# Patient Record
Sex: FEMALE | Race: BLACK OR AFRICAN AMERICAN | NOT HISPANIC OR LATINO | Employment: UNEMPLOYED
[De-identification: names, ages, dates, MRNs, and addresses within clinical notes are randomized per-mention and may not be internally consistent; named-entity substitution may affect disease eponyms.]

---

## 2017-12-24 ENCOUNTER — HEALTH MAINTENANCE LETTER (OUTPATIENT)
Age: 22
End: 2017-12-24

## 2021-08-17 ENCOUNTER — HOSPITAL ENCOUNTER (EMERGENCY)
Facility: CLINIC | Age: 26
Discharge: HOME | End: 2021-08-17
Attending: EMERGENCY MEDICINE | Admitting: EMERGENCY MEDICINE
Payer: COMMERCIAL

## 2021-08-17 VITALS
WEIGHT: 160 LBS | SYSTOLIC BLOOD PRESSURE: 98 MMHG | DIASTOLIC BLOOD PRESSURE: 54 MMHG | TEMPERATURE: 97.1 F | HEIGHT: 67 IN | HEART RATE: 68 BPM | OXYGEN SATURATION: 99 % | RESPIRATION RATE: 16 BRPM | BODY MASS INDEX: 25.11 KG/M2

## 2021-08-17 DIAGNOSIS — T78.40XA ALLERGIC REACTION, INITIAL ENCOUNTER: ICD-10-CM

## 2021-08-17 PROCEDURE — 250N000013 HC RX MED GY IP 250 OP 250 PS 637: Performed by: EMERGENCY MEDICINE

## 2021-08-17 PROCEDURE — 99283 EMERGENCY DEPT VISIT LOW MDM: CPT | Performed by: EMERGENCY MEDICINE

## 2021-08-17 PROCEDURE — 99284 EMERGENCY DEPT VISIT MOD MDM: CPT | Mod: GC | Performed by: EMERGENCY MEDICINE

## 2021-08-17 RX ORDER — DIPHENHYDRAMINE HCL 50 MG
50 CAPSULE ORAL ONCE
Status: COMPLETED | OUTPATIENT
Start: 2021-08-17 | End: 2021-08-17

## 2021-08-17 RX ORDER — DIPHENHYDRAMINE HCL 25 MG
25 TABLET ORAL EVERY 6 HOURS PRN
Qty: 28 TABLET | Refills: 0 | Status: SHIPPED | OUTPATIENT
Start: 2021-08-17 | End: 2021-08-24

## 2021-08-17 RX ORDER — METHYLPREDNISOLONE 4 MG
TABLET, DOSE PACK ORAL
Qty: 21 TABLET | Refills: 0 | Status: SHIPPED | OUTPATIENT
Start: 2021-08-17 | End: 2022-07-09

## 2021-08-17 RX ADMIN — DIPHENHYDRAMINE HYDROCHLORIDE 50 MG: 50 CAPSULE ORAL at 07:28

## 2021-08-17 ASSESSMENT — ENCOUNTER SYMPTOMS
DIARRHEA: 0
ACTIVITY CHANGE: 0
LIGHT-HEADEDNESS: 0
WOUND: 0
CHOKING: 0
PHOTOPHOBIA: 0
DIZZINESS: 0
COUGH: 0
WEAKNESS: 0
FACIAL SWELLING: 0
DIAPHORESIS: 0
APNEA: 0
PALPITATIONS: 0
VOMITING: 0
WHEEZING: 0
CHILLS: 0
RHINORRHEA: 0
ABDOMINAL PAIN: 0
SORE THROAT: 0
ARTHRALGIAS: 0
CONSTIPATION: 0
TROUBLE SWALLOWING: 0
NUMBNESS: 0
FEVER: 0
NAUSEA: 0
CONFUSION: 0
SHORTNESS OF BREATH: 0

## 2021-08-17 ASSESSMENT — MIFFLIN-ST. JEOR: SCORE: 1503.39

## 2021-08-17 NOTE — DISCHARGE INSTRUCTIONS
Please make an appointment to follow up with your Primary Care Provider 5-7 days and Allergy Clinic ([phone]). You should get allergy testing to determine potential allergens.      You were given a referral to Allergy clinic. Someone should call you to set up this appointment, but please call the number listed above, if you do not hear from someone within 1-2 business days.

## 2021-08-17 NOTE — ED PROVIDER NOTES
ED Provider Note  [facility]      History     Chief Complaint   Patient presents with     Hives     HPI  Nik Ross is a 25 year old previously healthy female who presents with hives on her face and arms overnight. Nik notes that she had shrimp scampi at 7PM at a new restaurant and did not notice any symptoms immediately after. At around 10PM she noticed small area of hives on each cheek, which she iced for a bit and then went to bed. At 1AM she noticed a worsening of the hives mostly on the left side of her face. At around 5AM Nik noticed hives extending down her bilateral arms. Since then she states that the hives have improved and no longer are present on her face. She has not noticed hives on any other part of her body. Nik states that she has felt warm and itchy. She has not taken any medication for the hives. Nik reports that she has no known allergies and has never had a similar rash in the past. She reports no associated pain, sensation change, difficulty breathing, nausea/vomiting, increased diarrhea/constipation, abdominal pain, fevers/chills, sore throat, or chest pain.    Past Medical History  History reviewed. No pertinent past medical history.  History reviewed. No pertinent surgical history.  diphenhydrAMINE (BENADRYL) 25 MG tablet  methylPREDNISolone (MEDROL DOSEPAK) 4 MG tablet therapy pack      No Known Allergies  Family History  History reviewed. No pertinent family history.  Social History   Social History     Tobacco Use     Smoking status: Never Smoker     Smokeless tobacco: Never Used   Substance Use Topics     Alcohol use: Not Currently     Drug use: Never      Past medical history, past surgical history, medications, allergies, family history, and social history were reviewed with the patient. No additional pertinent items.       Review of Systems   Constitutional: Negative for activity change, chills, diaphoresis and fever.   HENT: Negative for  "congestion, facial swelling, postnasal drip, rhinorrhea, sore throat and trouble swallowing.    Eyes: Negative for photophobia and visual disturbance.   Respiratory: Negative for apnea, cough, choking, shortness of breath and wheezing.    Cardiovascular: Negative for chest pain and palpitations.   Gastrointestinal: Negative for abdominal pain, constipation, diarrhea, nausea and vomiting.   Musculoskeletal: Negative for arthralgias.   Skin: Positive for rash (Hives). Negative for wound.   Neurological: Negative for dizziness, syncope, weakness, light-headedness and numbness.   Psychiatric/Behavioral: Negative for behavioral problems and confusion.     A complete review of systems was performed with pertinent positives and negatives noted in the HPI, and all other systems negative.    Physical Exam   BP: 113/72  Pulse: 73  Temp: 98.7  F (37.1  C)  Resp: 16  Height: 170.2 cm (5' 7\")  Weight: 72.6 kg (160 lb)  SpO2: 100 %  Physical Exam  Vitals and nursing note reviewed.   Constitutional:       General: She is not in acute distress.     Appearance: She is normal weight. She is not ill-appearing.   HENT:      Head: Normocephalic and atraumatic.      Right Ear: External ear normal.      Left Ear: External ear normal.      Nose: Nose normal. No congestion or rhinorrhea.      Mouth/Throat:      Mouth: Mucous membranes are moist.      Pharynx: Oropharynx is clear. No oropharyngeal exudate or posterior oropharyngeal erythema.   Eyes:      Extraocular Movements: Extraocular movements intact.      Conjunctiva/sclera: Conjunctivae normal.   Cardiovascular:      Rate and Rhythm: Normal rate and regular rhythm.      Heart sounds: Normal heart sounds. No murmur heard.   No friction rub. No gallop.    Pulmonary:      Effort: Pulmonary effort is normal. No respiratory distress.      Breath sounds: Normal breath sounds. No wheezing, rhonchi or rales.   Abdominal:      General: Abdomen is flat.      Palpations: Abdomen is soft.      " Tenderness: There is no abdominal tenderness. There is no guarding or rebound.   Musculoskeletal:         General: No swelling or tenderness.   Skin:     General: Skin is warm.      Findings: Rash (Mild urticaria on bilateral arms. Not visualized on face) present. No erythema or lesion.   Neurological:      General: No focal deficit present.      Mental Status: She is alert and oriented to person, place, and time.      Sensory: No sensory deficit.   Psychiatric:         Mood and Affect: Mood normal.         Behavior: Behavior normal.       ED Course      Procedures       Patient received 50mg benadryl in the ED.     No results found for any visits on 08/17/21.  Medications   diphenhydrAMINE (BENADRYL) capsule 50 mg (50 mg Oral Given 8/17/21 0728)        Assessments & Plan (with Medical Decision Making)   Nik Ross is a 25 year old previously healthy female who presents with hives on her face and arms overnight. This is most likely a mild allergic reaction to an unknown allergen given patients urticaria on face and arms that started after eating at a new restaurant. Nik only has skin involvement at this point with no signs of angioedema, difficulty breathing, or lightheadedness making anaphylaxis unlikely. Given minor symptoms patient did not require dose of corticosteroids in ED or epinephrine. Patient was administered 50mg Benadryl in the ED. Patient was discharged home with additional Benedryl and a Medrol Dosepak. Return precautions include difficulty breathing or signs of syncope or presyncope such as lightheadedness or dizziness. We recommended Nik follow-up with an allergy specialist to be tested for potential allergens in the near future.    I have reviewed the nursing notes. I have reviewed the findings, diagnosis, plan and need for follow up with the patient.    Discharge Medication List as of 8/17/2021  8:49 AM      START taking these medications    Details   diphenhydrAMINE (BENADRYL) 25 MG  tablet Take 1 tablet (25 mg) by mouth every 6 hours as needed for itching or allergies, Disp-28 tablet, R-0, E-Prescribe      methylPREDNISolone (MEDROL DOSEPAK) 4 MG tablet therapy pack Follow Package Directions, Disp-21 tablet, R-0, E-Prescribe             Final diagnoses:   Allergic reaction, initial encounter     Fili Bernstein, MS4  [employer]      --  Ida Perkins  [facility] EMERGENCY DEPARTMENT  8/17/2021    --    ED Attending Physician Attestation    I Ida Perkins MD, cared for this patient with the Medical Student. I performed, or re-performed, the physical exam and medical decision-making. I have verified the accuracy of all the medical student findings and documentation above, and have edited as necessary.    Summary of HPI, PE, ED Course   Patient is a 25 year old female evaluated in the emergency department for hives. Exam notable for mild urticarial rash. No angioedema or resp distress. ED course notable for improvement with benadryl. After the completion of care in the emergency department, the patient was discharged. Given rx medrol dosepak and benadryl. Referred to allergy clinic. Return precautions provided.       Critical Care & Procedures  Not applicable.    Medical Decision Making  The medical record was reviewed and interpreted.  Managed outpatient prescription medications.      Ida Perkins MD  Emergency Medicine          Ida Perkins MD  08/17/21 2014

## 2021-08-17 NOTE — ED TRIAGE NOTES
Pt reports development of worsening whole body hives since 2200 yesterday. States symptoms began shortly after finishing a meal at a new restaurant. Pt denies known allergies but notes that meal contained shrimp. Denies SOB/Difficulty swallowing. Breathing unlabored, no signs of acute distress at this time.   Patient with one or more new problems requiring additional work-up/treatment.

## 2021-08-18 ENCOUNTER — TELEPHONE (OUTPATIENT)
Dept: ALLERGY | Facility: CLINIC | Age: 26
End: 2021-08-18
Payer: COMMERCIAL

## 2021-08-18 NOTE — TELEPHONE ENCOUNTER
[facility]    Phone Message    May a detailed message be left on voicemail: yes     Reason for Call: Appointment Intake    Referring Provider Name: Dr Ida Perkins  Diagnosis and/or Symptoms: Hives due to food allergy, see ED note. New referral need review, thanks!     Action Taken: Message routed to:  [facility]): Allergy    Travel Screening: Not Applicable

## 2021-10-12 NOTE — TELEPHONE ENCOUNTER
FUTURE VISIT INFORMATION      FUTURE VISIT INFORMATION:    Date: 11.16.21    Time: 12:15 PM    Location:  Allergy  REFERRAL INFORMATION:    Referring provider:  Gregorio    Referring providers clinic:  [facility]    Reason for visit/diagnosis  Allergic reaction    RECORDS REQUESTED FROM:       Clinic name Comments Records Status Imaging Status   [facility] 8.17.21 Gregorio CORDOBA

## 2021-11-16 ENCOUNTER — OFFICE VISIT (OUTPATIENT)
Dept: ALLERGY | Facility: CLINIC | Age: 26
End: 2021-11-16
Attending: EMERGENCY MEDICINE
Payer: COMMERCIAL

## 2021-11-16 ENCOUNTER — PRE VISIT (OUTPATIENT)
Dept: ALLERGY | Facility: CLINIC | Age: 26
End: 2021-11-16

## 2021-11-16 DIAGNOSIS — J31.0 CHRONIC RHINITIS: ICD-10-CM

## 2021-11-16 DIAGNOSIS — L50.8 ACUTE URTICARIA: Primary | ICD-10-CM

## 2021-11-16 DIAGNOSIS — Z91.018 FOOD ALLERGY: ICD-10-CM

## 2021-11-16 DIAGNOSIS — T78.40XA ALLERGIC REACTION, INITIAL ENCOUNTER: ICD-10-CM

## 2021-11-16 PROCEDURE — 99203 OFFICE O/P NEW LOW 30 MIN: CPT | Mod: 25 | Performed by: DERMATOLOGY

## 2021-11-16 PROCEDURE — 95004 PERQ TESTS W/ALRGNC XTRCS: CPT | Performed by: DERMATOLOGY

## 2021-11-16 RX ORDER — PREDNISONE 50 MG/1
TABLET ORAL
Qty: 2 TABLET | Refills: 1 | Status: SHIPPED | OUTPATIENT
Start: 2021-11-16 | End: 2022-07-09

## 2021-11-16 RX ORDER — CETIRIZINE HYDROCHLORIDE 10 MG/1
TABLET ORAL
Qty: 10 TABLET | Refills: 1 | Status: SHIPPED | OUTPATIENT
Start: 2021-11-16 | End: 2022-07-09

## 2021-11-16 ASSESSMENT — PAIN SCALES - GENERAL: PAINLEVEL: NO PAIN (0)

## 2021-11-16 NOTE — LETTER
11/16/2021         RE: Nik Ross  [address]  [facility] [address]        Dear Colleague,    Thank you for referring your patient, Nik Ross, to the [facility] [place]. Please see a copy of my visit note below.    [facility] Dermato-allergology Note  Office visit  Encounter Date: Nov 16, 2021  ____________________________________________    CC: No chief complaint on file.      HPI:  (11/15/21)  Ms. Nik Ross is a(n) 26 year old female who presents today as a new patient for allergy consultation  - August 17th during the day nothing ordinary and went to a restaurant around 7pm. Ate shrimp scampi (noodles with shrimp), drinking some weird house drink (club soda in matcha and honey, lemon). No problems later, but around 11pm, when falling asleep after shower developed first in face and then arms and ears, redness, heat, swelling and hot uncomfortable feeling. Went to emergency around 5pm and got there a Benadryl and Prednisone. Diffuse swelling of arms and face. No signs for Angioedema.  - throughout the week on and off flare ups, but within 1 week resolved  - felt totally OK, no fever, no Tylenol (not that day)  - no exercise, no medications  - not sure if eaten shrimps afterwards  - Never before or after allergies    Physical exam:  General: In no acute distress, well-developed, well-nourished  Eyes: no conjunctivitis  ENT: no signs of rhinitis   Pulmonary: no wheezing or coughing  Skin: Focused examination of the skin on test sites was performed = see test results below    Past Medical History:   There is no problem list on file for this patient.    No past medical history on file.    Allergies:  No Known Allergies    Medications:  Current Outpatient Medications   Medication     methylPREDNISolone (MEDROL DOSEPAK) 4 MG tablet therapy pack     No current facility-administered medications for this visit.       Social History:  The  patient in the moment unemployed (in August no work) --> Bachelor in global studies. Patient has the following hobbies or non-occupational exposure: none    Family History:  No family history on file.    Previous Labs, Allergy Tests, Dermatopathology, Imaging:  none    Referred By: Ida Perkins MD  [address] [place] [address]     Allergy Tests:    Past Allergy Test    Order for Future Allergy Testing:    [] Outpatient  [] Inpatient: [facility]..../ Bed ....       Skin Atopy (atopic dermatitis) [] Yes   [] No .........  Contact allergies:   [] Yes   [] No ..........  Hand eczema:   [] Yes   [] No           Leading hand:   [] R   [] L       [] Ambidextrous         Drug allergies:        [] Yes   [] No  which?......    Urticaria/Angioedema  [] Yes   [] No .........  Food Allergy:  [] Yes   [] No  which?......  Pets :  [] Yes   [] No  which?......         [x]  Rhinitis   [] Conjunctivitis   [] Sinusitis   [] Polyposis   [] Otitis   [] Pharyngitis         []  none  Operations:   [] Tonsils   [] Septum   [] Sinus   [] Polyposis        [] Asthma bronchiale   [] Coughing      [x]  none  Symptoms (mostly Rhinoconjunctivitis and Asthma) aggravated by:  Season   [] I   [] II   [] III   [] IV   []V   []VI   []VII   []VIII   []IX   []X   []XI   []XI     [x] perennial since childhood  Day time      [] morning   [] noon      [] evening        [] night    [x] whole day........  []  none  Location/changes    [x] inside        [] outside   [] mountains    [] sea     [] others.............   []  none  Triggers, specific     [] animals     [] plants     [] dust              [] others ...........................    []  none  Triggers, others       [] work          [] psyche    [] sport            [] others .............................  []  none  Irritant                [] phys efforts [] smoke    [] heat/cold     [] odors  []others............... []  none      Order for PRICK TESTS    Reason for tests (suspected allergy):  acute Urticaria (food?) and perennial rhinitis  Known previous allergies: none    Standardized prick panels  [x] Atopic panel (20 tests)  [] Pediatric Panel (12 tests)  [] Milk, Meat, Eggs, Grains (20 tests)   [] Dust, Epithelia, Feathers (10 tests)  [x] Fish, Seafood, Shellfish (17 tests)  [] Nuts, Beans (14 tests)  [] Spice, Vegetable, Fruit (17 tests)  [] Pollen Panel = Tree, Grass, Weed (24 tests)  [] Others: ...      [x] Patient's own products: maybe in future prick/prick with food and tea    DO NOT test if chemical or biological identity is unknown!     always ask from patient the product information and safety sheets (MSDS)     Standardized intradermal tests  [] Penicillium notatum [] Aspergillus fumigatus [] House dust mites D.far & D. pteron  [] Cat    [] dog  [] Others: ...  [] Bee venom   [] Wasp venom  !!Specific protocol with dilutions!!       Order for Drug allergy tests (prick & Intradermal & patch tests)    [] Penicillin G  [] Ampicillin [] Cefazolin   [] Ceftriaxone   [] Ceftazidime  [] Bactrim    [] Others: ...  Order for ... as test date      Atopy Screen (Placed 11/16/21)    No Substance Readings (15 min) Evaluation   POS Histamine 1mg/ml -    NEG NaCl 0.9% -      No Substance Readings (15 min) Evaluation   1 Alternaria alternata (tenuis)  -    2 Cladosporium herbarum -    3 Aspergillus fumigatus -    4 Penicillium notatum -    5 Dermatophagoides pteronyssinus -    6 Dermatophagoides farinae -    7 Dog epithelium (canis spp) -    8 Cat hair (shelly catus) -    9 Cockroach   (Blatella americana & germanica) -    10 Grass mix midwest   (Marlene, Orchard, Redtop, David) -    11 Richi grass (sorghum halepense) -    12 Weed mix   (common Cocklebur, Lamb s quarters, rough redroot Pigweed, Dock/Sorrel) -    13 Mug wort (artemisia vulgare) -    14 Ragweed giant/short (ambrosia spp) -    15 White birch (Betula papyrifera) -    16 Tree mix 1 (Pecan, Maple BHR, Oak RVW, american [place], black [place]) -     17 Red cedar (juniperus virginia) -    18 Tree mix 2   (white Jayden, river/red Birch, black [place], common Navarro, american Elm) -    19 Box elder/Maple mix (acer spp) -    20 [place] shagbark (carya ovata) -           Conclusion    Fish and Seafood (Placed 11/16/21 )    11 Crab (callinectes spp) -    12 Lobster (homarus americanus) -    13 Shrimp white/brown/pink (farfantepenaeus&litopenaeus) -    14 Kingcrab (paralithodes camtschatica) -    15 Scallop (placopecten magellanicus) -    16 Clam (mercenaria mercenaria) -    17 Oyster (cstrea/crassostrea) -      Conclusion:  ________________________________    Assessment & Plan:    ==> Final Diagnosis:     # perennial Rhinitis (during the day)    = no signs for sensitization to pollens or other environmental allergens  * chronic illness with exacerbation, progression, side effects from treatment    # acute flare up of Urticaria after eating shrimp noodles and matcha/soda drink   = no signs for allergy to shellfish    * acute illness with systemic symptoms      These conclusions are made at the best of one's knowledge and belief based on the provided evidence such as patient's history and allergy test results and they can change over time or can be incomplete because of missing information's.    ==> Treatment Plan:  >> Emergency set and 12h retrospective diary if taken  >> if recurrences, then come with the food that was involved and the 12h retrospective diary      Procedures Performed: Allergy tests, including prick tests    Staff:  Provider    Follow-up in Derm-Allergy clinic if necessary    I spent a total of 30 minutes with Nik Ross. This time was spent counseling the patient and/or coordinating care, explaining the allergy tests , performing allergy tests and assessing the clinical relevance.        Again, thank you for allowing me to participate in the care of your patient.        Sincerely,        Yifan Delgado MD

## 2021-11-16 NOTE — NURSING NOTE
Chief Complaint   Patient presents with     Allergy Consult     Nik is here today off of an episode in the Summer where she had an allergic reaction. She has no idea what the trigger was to cause the symptoms.      Fili Stoddard, Paramedic

## 2021-11-16 NOTE — PROGRESS NOTES
[facility] Dermato-allergology Note  Office visit  Encounter Date: Nov 16, 2021  ____________________________________________    CC: No chief complaint on file.      HPI:  (11/15/21)  Ms. Nik Ross is a(n) 26 year old female who presents today as a new patient for allergy consultation  - August 17th during the day nothing ordinary and went to a restaurant around 7pm. Ate shrimp scampi (noodles with shrimp), drinking some weird house drink (club soda in matcha and honey, lemon). No problems later, but around 11pm, when falling asleep after shower developed first in face and then arms and ears, redness, heat, swelling and hot uncomfortable feeling. Went to emergency around 5pm and got there a Benadryl and Prednisone. Diffuse swelling of arms and face. No signs for Angioedema.  - throughout the week on and off flare ups, but within 1 week resolved  - felt totally OK, no fever, no Tylenol (not that day)  - no exercise, no medications  - not sure if eaten shrimps afterwards  - Never before or after allergies    Physical exam:  General: In no acute distress, well-developed, well-nourished  Eyes: no conjunctivitis  ENT: no signs of rhinitis   Pulmonary: no wheezing or coughing  Skin: Focused examination of the skin on test sites was performed = see test results below    Past Medical History:   There is no problem list on file for this patient.    No past medical history on file.    Allergies:  No Known Allergies    Medications:  Current Outpatient Medications   Medication     methylPREDNISolone (MEDROL DOSEPAK) 4 MG tablet therapy pack     No current facility-administered medications for this visit.       Social History:  The patient in the moment unemployed (in August no work) --> Bachelor in global studies. Patient has the following hobbies or non-occupational exposure: none    Family History:  No family history on file.    Previous Labs, Allergy Tests, Dermatopathology,  Imaging:  none    Referred By: Ida Perkins MD  [address] [place] [address]     Allergy Tests:    Past Allergy Test    Order for Future Allergy Testing:    [] Outpatient  [] Inpatient: [facility]..../ Bed ....       Skin Atopy (atopic dermatitis) [] Yes   [] No .........  Contact allergies:   [] Yes   [] No ..........  Hand eczema:   [] Yes   [] No           Leading hand:   [] R   [] L       [] Ambidextrous         Drug allergies:        [] Yes   [] No  which?......    Urticaria/Angioedema  [] Yes   [] No .........  Food Allergy:  [] Yes   [] No  which?......  Pets :  [] Yes   [] No  which?......         [x]  Rhinitis   [] Conjunctivitis   [] Sinusitis   [] Polyposis   [] Otitis   [] Pharyngitis         []  none  Operations:   [] Tonsils   [] Septum   [] Sinus   [] Polyposis        [] Asthma bronchiale   [] Coughing      [x]  none  Symptoms (mostly Rhinoconjunctivitis and Asthma) aggravated by:  Season   [] I   [] II   [] III   [] IV   []V   []VI   []VII   []VIII   []IX   []X   []XI   []XI     [x] perennial since childhood  Day time      [] morning   [] noon      [] evening        [] night    [x] whole day........  []  none  Location/changes    [x] inside        [] outside   [] mountains    [] sea     [] others.............   []  none  Triggers, specific     [] animals     [] plants     [] dust              [] others ...........................    []  none  Triggers, others       [] work          [] psyche    [] sport            [] others .............................  []  none  Irritant                [] phys efforts [] smoke    [] heat/cold     [] odors  []others............... []  none      Order for PRICK TESTS    Reason for tests (suspected allergy): acute Urticaria (food?) and perennial rhinitis  Known previous allergies: none    Standardized prick panels  [x] Atopic panel (20 tests)  [] Pediatric Panel (12 tests)  [] Milk, Meat, Eggs, Grains (20 tests)   [] Dust, Epithelia, Feathers (10 tests)  [x]  Fish, Seafood, Shellfish (17 tests)  [] Nuts, Beans (14 tests)  [] Spice, Vegetable, Fruit (17 tests)  [] Pollen Panel = Tree, Grass, Weed (24 tests)  [] Others: ...      [x] Patient's own products: maybe in future prick/prick with food and tea    DO NOT test if chemical or biological identity is unknown!     always ask from patient the product information and safety sheets (MSDS)     Standardized intradermal tests  [] Penicillium notatum [] Aspergillus fumigatus [] House dust mites D.far & D. pteron  [] Cat    [] dog  [] Others: ...  [] Bee venom   [] Wasp venom  !!Specific protocol with dilutions!!       Order for Drug allergy tests (prick & Intradermal & patch tests)    [] Penicillin G  [] Ampicillin [] Cefazolin   [] Ceftriaxone   [] Ceftazidime  [] Bactrim    [] Others: ...  Order for ... as test date      Atopy Screen (Placed 11/16/21)    No Substance Readings (15 min) Evaluation   POS Histamine 1mg/ml -    NEG NaCl 0.9% -      No Substance Readings (15 min) Evaluation   1 Alternaria alternata (tenuis)  -    2 Cladosporium herbarum -    3 Aspergillus fumigatus -    4 Penicillium notatum -    5 Dermatophagoides pteronyssinus -    6 Dermatophagoides farinae -    7 Dog epithelium (canis spp) -    8 Cat hair (shelly catus) -    9 Cockroach   (Blatella americana & germanica) -    10 Grass mix midwest   (Marlene, Orchard, Redtop, David) -    11 Richi grass (sorghum halepense) -    12 Weed mix   (common Cocklebur, Lamb s quarters, rough redroot Pigweed, Dock/Sorrel) -    13 Mug wort (artemisia vulgare) -    14 Ragweed giant/short (ambrosia spp) -    15 White birch (Betula papyrifera) -    16 Tree mix 1 (Pecan, Maple BHR, Oak RVW, american [place], black Stuart) -    17 Red cedar (juniperus virginia) -    18 Tree mix 2   (white Jayden, river/red Birch, black [place], common [place], american Elm) -    19 Box elder/Maple mix (acer spp) -    20 Yaphank shagbark (carya ovata) -           Conclusion    Fish and Seafood  (Placed 11/16/21 )    11 Crab (callinectes spp) -    12 Lobster (homarus americanus) -    13 Shrimp white/brown/pink (farfantepenaeus&litopenaeus) -    14 Kingcrab (paralithodes camtschatica) -    15 Scallop (placopecten magellanicus) -    16 Clam (mercenaria mercenaria) -    17 Oyster (cstrea/crassostrea) -      Conclusion:  ________________________________    Assessment & Plan:    ==> Final Diagnosis:     # perennial Rhinitis (during the day)    = no signs for sensitization to pollens or other environmental allergens  * chronic illness with exacerbation, progression, side effects from treatment    # acute flare up of Urticaria after eating shrimp noodles and matcha/soda drink   = no signs for allergy to shellfish    * acute illness with systemic symptoms      These conclusions are made at the best of one's knowledge and belief based on the provided evidence such as patient's history and allergy test results and they can change over time or can be incomplete because of missing information's.    ==> Treatment Plan:  >> Emergency set and 12h retrospective diary if taken  >> if recurrences, then come with the food that was involved and the 12h retrospective diary      Procedures Performed: Allergy tests, including prick tests    Staff:  Provider    Follow-up in Derm-Allergy clinic if necessary    I spent a total of 30 minutes with Nik Ross. This time was spent counseling the patient and/or coordinating care, explaining the allergy tests , performing allergy tests and assessing the clinical relevance.

## 2021-11-16 NOTE — PATIENT INSTRUCTIONS
Emergency treatment for patients with severe immediate allergic reactions to drugs, food or insect bites:      The clinical appearance of severe immediate type allergic reactions can range from wheals and hives all over the body (urticaria), to swellings in the face (eyes, lips) to sometimes swellings in the tongue or airways with problems to swallow or breathe. These reactions can end up in cardiovascular reactions with collapse and shock.    1. Stay calm, avoid running around, and alert other people to help you    2. Immediately take your emergency medication.     For adults (over 16 years old): 2 tablet of antihistamines (e.g. cetirizine 10 mg or fexofenadine 180 mg) and 100 mg prednisone.     For children (less than 16 years old): 1 tablet of antihistamine (e.g. cetirizine 10 mg or fexofenadine 180 mg) and 50 mg prednisone    Swallow however you can, with or without water. This treatment is usually sufficient for treatment of uncomplicated hives and swellings.       Emergency set in key chain box containing 2 tablets prednisone 50 mg and 2 tablets cetirizine 10 mg.    3. In case of acute swelling of tongue, trouble swallowing, blocking of the airways, breathing problems, and/or signs of imminent collapse of shock (sweating, weakness, dizziness, paleness), use the adrenalin auto-injector (Epipen   for adults and Epipen   stuart for children). Make an injection into the outer thighs, if necessary through clothing.    4. Seek immediate emergency medical treatment after use.        Yifan Delgado, [facility], [place], [facility]; 03-

## 2022-07-09 VITALS
SYSTOLIC BLOOD PRESSURE: 100 MMHG | OXYGEN SATURATION: 98 % | DIASTOLIC BLOOD PRESSURE: 71 MMHG | HEIGHT: 67 IN | HEART RATE: 108 BPM | BODY MASS INDEX: 25.11 KG/M2 | WEIGHT: 160 LBS | RESPIRATION RATE: 16 BRPM | TEMPERATURE: 98.3 F

## 2022-07-09 PROCEDURE — 99283 EMERGENCY DEPT VISIT LOW MDM: CPT | Performed by: EMERGENCY MEDICINE

## 2022-07-09 PROCEDURE — C9803 HOPD COVID-19 SPEC COLLECT: HCPCS | Performed by: EMERGENCY MEDICINE

## 2022-07-10 ENCOUNTER — HOSPITAL ENCOUNTER (EMERGENCY)
Facility: CLINIC | Age: 27
Discharge: HOME | End: 2022-07-10
Attending: EMERGENCY MEDICINE | Admitting: EMERGENCY MEDICINE
Payer: COMMERCIAL

## 2022-07-10 DIAGNOSIS — U07.1 INFECTION DUE TO 2019 NOVEL CORONAVIRUS: ICD-10-CM

## 2022-07-10 LAB
GROUP A STREP BY PCR: NOT DETECTED
SARS-COV-2 RNA RESP QL NAA+PROBE: POSITIVE

## 2022-07-10 PROCEDURE — U0003 INFECTIOUS AGENT DETECTION BY NUCLEIC ACID (DNA OR RNA); SEVERE ACUTE RESPIRATORY SYNDROME CORONAVIRUS 2 (SARS-COV-2) (CORONAVIRUS DISEASE [COVID-19]), AMPLIFIED PROBE TECHNIQUE, MAKING USE OF HIGH THROUGHPUT TECHNOLOGIES AS DESCRIBED BY CMS-2020-01-R: HCPCS | Performed by: EMERGENCY MEDICINE

## 2022-07-10 PROCEDURE — 87651 STREP A DNA AMP PROBE: CPT | Performed by: EMERGENCY MEDICINE

## 2022-07-10 PROCEDURE — 250N000013 HC RX MED GY IP 250 OP 250 PS 637: Performed by: EMERGENCY MEDICINE

## 2022-07-10 RX ORDER — IBUPROFEN 600 MG/1
600 TABLET, FILM COATED ORAL ONCE
Status: COMPLETED | OUTPATIENT
Start: 2022-07-10 | End: 2022-07-10

## 2022-07-10 RX ADMIN — IBUPROFEN 600 MG: 600 TABLET, FILM COATED ORAL at 02:28

## 2022-07-10 NOTE — DISCHARGE INSTRUCTIONS
Return to the emergency department if you develop worsening shortness of breath or if you have any further concerns    You need to isolate yourself for at least 5 days until your symptoms resolve.  Please review current CDC guidelines.

## 2022-07-10 NOTE — ED TRIAGE NOTES
Pt presents to ER with C/O sore throat, fever to 100.4, body aches, headache, dizziness since yesterday. Worse today.

## 2022-07-10 NOTE — ED PROVIDER NOTES
"ED Provider Note  [facility]      History     Chief Complaint   Patient presents with     Fever     Pharyngitis     HPI  Nik Ross is a 26 year old female who is presenting with one day of sore throat. States she had a fever, headache in back of head and sinus pressure. No ear pain. Patient has had mild nausea without vomiting. No cough or SOB, no CP. No dysuria/hematuria. No neck pain/stiffness. Cousin had cold, no known covid contacts but states there is a fever going on around house. Denies abd pain. No diarrhea. Patient is eating and drinking ok, no trouble swallowing. She took 1500mg of tylenol spaced throughout the day, helped her symptoms. States she has had some chills. No chronic meds.    Past Medical History  History reviewed. No pertinent past medical history.  History reviewed. No pertinent surgical history.  No current outpatient medications on file.    No Known Allergies  Family History  History reviewed. No pertinent family history.  Social History   Social History     Tobacco Use     Smoking status: Never Smoker     Smokeless tobacco: Never Used   Substance Use Topics     Alcohol use: Not Currently     Drug use: Never      Past medical history, past surgical history, medications, allergies, family history, and social history were reviewed with the patient. No additional pertinent items.       Review of Systems  A complete review of systems was performed with pertinent positives and negatives noted in the HPI, and all other systems negative.    Physical Exam   BP: 100/71  Pulse: 108  Temp: 98.3  F (36.8  C)  Resp: 16  Height: 170.2 cm (5' 7\")  Weight: 72.6 kg (160 lb)  SpO2: 98 %  Physical Exam  Physical Exam   Constitutional: oriented to person, place, and time. appears well-developed and well-nourished.   HENT:   Head: Normocephalic and atraumatic. Uvula midline, no peritonsillar swelling, some mild erythema posteriorly.  no tongue swelling/elevation, no " submandibular swelling/color change.   Neck: Normal range of motion. No nuchal rigidity. No lymphadenopathy.  Pulmonary/Chest: Effort normal. No respiratory distress.   Cardiac: No murmurs, rubs, gallops. RRR.  Abdominal: Abdomen soft, nontender, nondistended. No rebound tenderness.  MSK: Long bones without deformity or evidence of trauma  Neurological: alert and oriented to person, place, and time.   Skin: Skin is warm and dry.   Psychiatric:  normal mood and affect.  behavior is normal. Thought content normal.     ED Course      Procedures     Results for orders placed or performed during the hospital encounter of 07/10/22   Asymptomatic COVID-19 Virus (Coronavirus) by PCR Nose     Status: Abnormal    Specimen: Nose; Swab   Result Value Ref Range    SARS CoV2 PCR Positive (A) Negative, Testing sent to reference lab. Results will be returned via unsolicited result    Narrative    Testing was performed using the Xpert Xpress SARS-CoV-2 Assay on the  Cepheid Gene-Xpert Instrument Systems. Additional information about  this Emergency Use Authorization (EUA) assay can be found via the Lab  Guide. This test should be ordered for the detection of SARS-CoV-2 in  individuals who meet SARS-CoV-2 clinical and/or epidemiological  criteria. Test performance is unknown in asymptomatic patients. This  test is for in vitro diagnostic use under the FDA EUA for  laboratories certified under CLIA to perform high complexity testing.  This test has not been FDA cleared or approved. A negative result  does not rule out the presence of PCR inhibitors in the specimen or  target RNA in concentration below the limit of detection for the  assay. The possibility of a false negative should be considered if  the patient's recent exposure or clinical presentation suggests  COVID-19. This test was validated by the [facility]. This laboratory is certified under  the Clinical Laboratory Improvement  Amendments of 1988 (CLIA-88) as  qualified to perform high complexity laboratory testing.     Group A Streptococcus PCR Throat Swab     Status: Normal    Specimen: Throat; Swab   Result Value Ref Range    Group A strep by PCR Not Detected Not Detected    Narrative    The Xpert Xpress Strep A test, performed on the [employer], is a rapid, qualitative in vitro diagnostic test for the detection of Streptococcus pyogenes (Group A ß-hemolytic Streptococcus, Strep A) in throat swab specimens from patients with signs and symptoms of pharyngitis. The Xpert Xpress Strep A test can be used as an aid in the diagnosis of Group A Streptococcal pharyngitis. The assay is not intended to monitor treatment for Group A Streptococcus infections. The Xpert Xpress Strep A test utilizes an automated real-time polymerase chain reaction (PCR) to detect Streptococcus pyogenes DNA.            No results found for any visits on 07/10/22.  Medications - No data to display     Assessments & Plan (with Medical Decision Making)   MDM  Patient presenting with COVID-like symptoms and she is indeed positive for COVID.  She otherwise appears well.  Has mild tachycardia but appears quite nontoxic.  She is offered ibuprofen here in the emergency department.  She does qualify for paxlovid.  Discussed at this may help reduce her symptoms however she does seem to be improving.  She will consider filling this prescription and taking it.  Discussed isolation at home and return precautions.  No significant chest pain or cardiorespiratory symptoms to suggest pericarditis, myocarditis, ACS, pneumothorax, bacterial pneumonia etc.    I have reviewed the nursing notes. I have reviewed the findings, diagnosis, plan and need for follow up with the patient.    New Prescriptions    No medications on file       Final diagnoses:   Infection due to 2019 novel coronavirus       --  Fili Patel  [facility] EMERGENCY  [facility]  7/9/2022     Fili Patel MD  07/10/22 045